# Patient Record
Sex: MALE | Race: WHITE
[De-identification: names, ages, dates, MRNs, and addresses within clinical notes are randomized per-mention and may not be internally consistent; named-entity substitution may affect disease eponyms.]

---

## 2017-02-10 ENCOUNTER — HOSPITAL ENCOUNTER (OUTPATIENT)
Dept: HOSPITAL 74 - FASU | Age: 57
Discharge: HOME | End: 2017-02-10
Attending: ORTHOPAEDIC SURGERY
Payer: COMMERCIAL

## 2017-02-10 VITALS — TEMPERATURE: 98.2 F

## 2017-02-10 VITALS — SYSTOLIC BLOOD PRESSURE: 116 MMHG | HEART RATE: 80 BPM | DIASTOLIC BLOOD PRESSURE: 76 MMHG

## 2017-02-10 VITALS — BODY MASS INDEX: 39.5 KG/M2

## 2017-02-10 DIAGNOSIS — M75.92: ICD-10-CM

## 2017-02-10 DIAGNOSIS — M65.812: ICD-10-CM

## 2017-02-10 DIAGNOSIS — M75.52: ICD-10-CM

## 2017-02-10 DIAGNOSIS — M19.012: ICD-10-CM

## 2017-02-10 DIAGNOSIS — M75.112: Primary | ICD-10-CM

## 2017-02-10 PROCEDURE — 0RNK4ZZ RELEASE LEFT SHOULDER JOINT, PERCUTANEOUS ENDOSCOPIC APPROACH: ICD-10-PCS | Performed by: ORTHOPAEDIC SURGERY

## 2017-02-10 PROCEDURE — 0RBK4ZZ EXCISION OF LEFT SHOULDER JOINT, PERCUTANEOUS ENDOSCOPIC APPROACH: ICD-10-PCS | Performed by: ORTHOPAEDIC SURGERY

## 2017-02-10 PROCEDURE — 0LB24ZZ EXCISION OF LEFT SHOULDER TENDON, PERCUTANEOUS ENDOSCOPIC APPROACH: ICD-10-PCS | Performed by: ORTHOPAEDIC SURGERY

## 2017-02-10 NOTE — OP
Operative Note





- Note:


Operative Date: 02/10/17


Pre-Operative Diagnosis: LEft shoulder OA, synovitis, partial cuff tear, labral 

tear


Operation: LSA, synovectomy, debridement, decompression


Surgeon: Juan Daniel Cash


Anesthesiologist/CRNA: John Paul Peters


Anesthesia: General


Operative Report Dictated: Yes

## 2017-02-10 NOTE — SURG
Surgery First Assist Note


First Assist: Chris Marlow PA-C


Date of Service: 02/10/17


Diagnosis: 


Left shoulder osteoarthritis, synovitis, partial cuff tear, labral tear





Procedure: 


Left shoulder arthroscopy, synovectomy, debridement, decompression


I was present for the entirety of the operative procedure. For further detail, 

please refer to operative report.








Visit type





- Case Type


Case Type: Scheduled Admission





- New patient


This patient is new to me today: Yes


Date on this admission: 02/10/17

## 2017-02-10 NOTE — DS
Physical Examination


Vital Signs: 


 Vital Signs











Temperature  98.8 F   02/10/17 08:15


 


Pulse Rate  75   02/10/17 08:15


 


Respiratory Rate  18   02/10/17 08:15


 


Blood Pressure  139/81   02/10/17 08:15


 


O2 Sat by Pulse Oximetry (%)  97   02/10/17 08:15














Discharge Summary


Reason For Visit: PARTIAL THICKNESS ROTATOR CUFF TEAR LEFT SHOULDER


Condition: Good





- Instructions


Diet, Activity, Other Instructions: 


                 Post Operative Instructions: Shoulder Arthroscopy


                           Dr Juan Daniel Cash  [phone]





1. Pain following a Shoulder Arthroscopy is variable and can be significant. 

Some 


    patients will have more pain than others. You have been provided with a 


    prescription for medication that contains a narcotic. You are not allowed to


    drive while on this medication. You should NOT take Tylenol (Acetaminophen)


    when taking the pain medication ( it will result in an overdose). Feel free 

to take 


    medications such as Ibuprofen or Naprosyn in addition to the pain medicine 

if 


    you do not have any problems with the NSAID class of medications. 





2. Apply ice to the shoulder for 15 minutes every hour. You may continue this 

for


    as many days as necessary.





3. You may find sleeping on an incline (reclining chair) to be more comfortable


    for the first few days.





4. You may remove your sling when the arm is comfortable.





5. You may use the arm as tolerated.





6. You may remove the bandages in 48 hours. You may shower at that point.


    


7. Place band-aids on the sutures after your shower.Do not put any creams


   or lotions on the incision until after the sutures are removed.





8. Please call the office to schedule a visit to have your sutures removed.





9. If for any reason you believe you may have an infection or are concerned,


   please feel free to call me. I can be reached through our office number 24 


   hours a day.





10. Please call our office with any questions; we will review the surgical 

findings


    during your post-operative visit.


     


Disposition: HOME





- Home Medications


Comprehensive Discharge Medication List: 


Ambulatory Orders





Amlodipine Besylate 5 mg PO DAILY 06/15/16 


Aspirin [Terence Chewable Aspirin] 81 mg PO DAILY #0  06/15/16 


Furosemide [Lasix -] 10 mg PO DAILY PRN 06/15/16 


Ibuprofen [Motrin -] 800 mg PO BID 06/15/16 


Omega-3 Fatty Acids/Fish Oil [Fish Oil 1,000 mg Softgel] 1 each PO DAILY 06/15/

16 


Oxymorphone HCl [Opana ER] 30 mg PO DAILY 06/15/16 


Prednisone 5 mg PO DAILY 06/15/16 


Wheat Dextrin [Benefiber] 144 gm PO DAILY #0 powder 06/15/16 


Colchicine [Colcrys] 0.6 mg PO DAILY 02/08/17 


Metformin HCl 500 mg PO DAILY 02/08/17

## 2017-02-14 NOTE — PATH
Surgical Pathology Report



Patient Name:  BARON SUE

Accession #:  

Med. Rec. #:  [id number]                                                        

   /Age/Gender:  1960 (Age: 56) / M

Account:  [id number]                                                          

             Location: [facility] AMBULATORY 

Taken:  2/10/2017

Received:  2/10/2017

Reported:  2017

Physicians:  Juan Daniel Cash M.D.

  



Specimen(s) Received

 LEFT SHOULDER SHAVINGS 





Clinical History

Partial-thickness rotator cuff tear left shoulder







Final Diagnosis

LEFT SHOULDER, ARTHROSCOPIC SHAVINGS:

PORTIONS OF FIBROCARTILAGE WITH MYXOID DEGENERATIVE CHANGES, SYNOVIUM, AND

HYALINE CARTILAGE.

CALCIFIED MATERIAL MORPHOLOGICALLY CONSISTENT WITH PSEUDOGOUT (CPPD) IDENTIFIED.





***Electronically Signed***

Jose Luis Riley M.D.





Gross Description

Received in formalin, labeled "left shoulder shavings," is a 3.5 x 3.0 x 0.3 cm.

aggregate of tan-yellow soft tissue fragments. A representative portion is

submitted in one cassette.

## 2018-03-06 ENCOUNTER — HOSPITAL ENCOUNTER (EMERGENCY)
Dept: HOSPITAL 74 - FER | Age: 58
Discharge: HOME | End: 2018-03-06
Payer: COMMERCIAL

## 2018-03-06 VITALS — BODY MASS INDEX: 32.3 KG/M2

## 2018-03-06 VITALS — SYSTOLIC BLOOD PRESSURE: 151 MMHG | HEART RATE: 82 BPM | TEMPERATURE: 98.3 F | DIASTOLIC BLOOD PRESSURE: 97 MMHG

## 2018-03-06 DIAGNOSIS — S60.211A: Primary | ICD-10-CM

## 2018-03-06 DIAGNOSIS — J45.909: ICD-10-CM
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
## 2018-03-06 DIAGNOSIS — S49.92XA: ICD-10-CM
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
## 2018-03-06 DIAGNOSIS — Z98.84: ICD-10-CM

## 2018-03-06 NOTE — PDOC
History of Present Illness





- General


Chief Complaint: Injury


Stated Complaint: FALL, BACK, CEHST, RT HIP, NURIS WRISTS


Time Seen by Provider: 03/06/18 10:13


History Source: Patient (Patient walked in complaining of a fall while getting 

through a door at a gas station )





Past History





- Past Medical History


Allergies/Adverse Reactions: 


 Allergies











Allergy/AdvReac Type Severity Reaction Status Date / Time


 


No Known Allergies Allergy   Verified 03/06/18 09:55











Home Medications: 


Ambulatory Orders





Amlodipine Besylate 5 mg PO DAILY 06/15/16 


Furosemide [Lasix -] 10 mg PO DAILY PRN 06/15/16 


Omega-3 Fatty Acids/Fish Oil [Fish Oil 1,000 mg Softgel] 1 each PO DAILY 06/15/

16 


Prednisone 5 mg PO DAILY 06/15/16 


Wheat Dextrin [Benefiber] 144 gm PO DAILY #0 powder 06/15/16 


Albuterol Sulfate Inhaler - [Ventolin Hfa Inhaler -] 2 inh PO Q4H 12/22/17 


Fluticasone Propionate [Flovent Hfa] 110 mcg IH BID 12/22/17 


Gabapentin 800 mg PO TID 12/22/17 


Multivitamin [Multiple Vitamins] 1 each PO DAILY 12/22/17 


Tizanidine HCl 2 mg PO TID 12/22/17 


Oxycodone HCl 10 mg PO TID PRN 03/06/18 








Anemia: No


Asthma: Yes


Cancer: No


Cardiac Disorders: No


CVA: No


COPD: No


CHF: No


Dementia: No


Diabetes: No


GI Disorders: No


 Disorders: No


HTN: Yes


Hypercholesterolemia: No


Liver Disease: No


Seizures: No


Thyroid Disease: No


Other medical history: ARTHRITIS, GOUT





- Surgical History


Abdominal Surgery: Yes (GASTRIC SLEEVE)


Appendectomy: Yes


Cardiac Surgery: No


Cholecystectomy: No


Lung Surgery: No


Neurologic Surgery: No


Orthopedic Surgery: Yes (RT ANKLE ORIF AND REMOVED 1999, left rotator cuff 

repair 2/17)





- Suicide/Smoking/Psychosocial Hx


Smoking History: Former smoker


Have you smoked in the past 12 months: No


If you are a former smoker, when did you quit?: 35 YEARS AGO AS A TEENAGER


Information on smoking cessation initiated: No


Hx Alcohol Use: No


Drug/Substance Use Hx: No


Substance Use Type: None


Hx Substance Use Treatment: No





*Physical Exam





- Vital Signs


 Last Vital Signs











Temp Pulse Resp BP Pulse Ox


 


 98.3 F   82   18   151/97   96 


 


 03/06/18 09:55  03/06/18 09:55  03/06/18 09:55  03/06/18 09:55  03/06/18 09:55














*DC/Admit/Observation/Transfer


Diagnosis at time of Disposition: 


Wrist contusion


Qualifiers:


 Encounter type: initial encounter Laterality: right Qualified Code(s): 

S60.211A - Contusion of right wrist, initial encounter





Shoulder injury


Qualifiers:


 Encounter type: initial encounter Laterality: left Qualified Code(s): S49.92XA 

- Unspecified injury of left shoulder and upper arm, initial encounter








- Discharge Dispostion


Condition at time of disposition: Stable


Admit: No





- Referrals


Referrals: 


Isaura Fuentes MD [Primary Care Provider] - 





- Patient Instructions


Printed Discharge Instructions:  How to Use a Sling





- Post Discharge Activity

## 2018-05-03 ENCOUNTER — HOSPITAL ENCOUNTER (EMERGENCY)
Dept: HOSPITAL 74 - JER | Age: 58
Discharge: HOME | End: 2018-05-03
Payer: COMMERCIAL

## 2018-05-03 VITALS — HEART RATE: 59 BPM | DIASTOLIC BLOOD PRESSURE: 88 MMHG | SYSTOLIC BLOOD PRESSURE: 127 MMHG

## 2018-05-03 VITALS — TEMPERATURE: 97.6 F

## 2018-05-03 VITALS — BODY MASS INDEX: 32.3 KG/M2

## 2018-05-03 DIAGNOSIS — I10: ICD-10-CM

## 2018-05-03 DIAGNOSIS — Z98.84: Primary | ICD-10-CM

## 2018-05-03 DIAGNOSIS — J45.909: ICD-10-CM

## 2018-05-03 LAB
ALBUMIN SERPL-MCNC: 4.5 G/DL (ref 3.4–5)
ALP SERPL-CCNC: 78 U/L (ref 45–117)
ALT SERPL-CCNC: 40 U/L (ref 12–78)
ANION GAP SERPL CALC-SCNC: 6 MMOL/L (ref 8–16)
ARTERIAL BLOOD GAS PCO2: 38.1 MMHG (ref 35–45)
ARTERIAL PATENCY WRIST A: POSITIVE
AST SERPL-CCNC: 21 U/L (ref 15–37)
BASE EXCESS BLDA CALC-SCNC: 5.7 MEQ/L (ref -2–2)
BASOPHILS # BLD: 0.6 % (ref 0–2)
BILIRUB SERPL-MCNC: 0.6 MG/DL (ref 0.2–1)
BUN SERPL-MCNC: 20 MG/DL (ref 7–18)
CALCIUM SERPL-MCNC: 9.4 MG/DL (ref 8.5–10.1)
CHLORIDE SERPL-SCNC: 101 MMOL/L (ref 98–107)
CO2 SERPL-SCNC: 30 MMOL/L (ref 21–32)
CREAT SERPL-MCNC: 0.9 MG/DL (ref 0.7–1.3)
DEPRECATED RDW RBC AUTO: 14.6 % (ref 11.9–15.9)
EOSINOPHIL # BLD: 0.4 % (ref 0–4.5)
GLUCOSE SERPL-MCNC: 118 MG/DL (ref 74–106)
HCT VFR BLD CALC: 41.7 % (ref 35.4–49)
HGB BLD-MCNC: 14.4 GM/DL (ref 11.7–16.9)
LYMPHOCYTES # BLD: 10.9 % (ref 8–40)
MAGNESIUM SERPL-MCNC: 2.2 MG/DL (ref 1.8–2.4)
MCH RBC QN AUTO: 30.1 PG (ref 25.7–33.7)
MCHC RBC AUTO-ENTMCNC: 34.5 G/DL (ref 32–35.9)
MCV RBC: 87.2 FL (ref 80–96)
MONOCYTES # BLD AUTO: 5.7 % (ref 3.8–10.2)
NEUTROPHILS # BLD: 82.4 % (ref 42.8–82.8)
PHOSPHATE SERPL-MCNC: 3.2 MG/DL (ref 2.5–4.9)
PLATELET # BLD AUTO: 276 K/MM3 (ref 134–434)
PMV BLD: 8.8 FL (ref 7.5–11.1)
PO2 BLDA: 73.6 MMHG (ref 80–100)
POTASSIUM SERPLBLD-SCNC: 3.9 MMOL/L (ref 3.5–5.1)
PROT SERPL-MCNC: 8.1 G/DL (ref 6.4–8.2)
RBC # BLD AUTO: 4.78 M/MM3 (ref 4–5.6)
SAO2 % BLDA: 96.6 % (ref 90–98.9)
SODIUM SERPL-SCNC: 137 MMOL/L (ref 136–145)
WBC # BLD AUTO: 9.4 K/MM3 (ref 4–10)

## 2018-05-03 PROCEDURE — 3E0337Z INTRODUCTION OF ELECTROLYTIC AND WATER BALANCE SUBSTANCE INTO PERIPHERAL VEIN, PERCUTANEOUS APPROACH: ICD-10-PCS

## 2018-05-03 NOTE — PDOC
Attending Attestation





- [facility]


HPI: 


05/03/18 11:14





The patient is 57 a year old male, with a significant past medical history of 

asthma, hypertension, rheumatoid arthritis and osteoarthritis, who presents to 

the emergency department s/p accidentally taking Lasix 200 mg this morning. He 

reports missing his dose of prednisone 5mg yesterday and tried to catch up 

today by taking 10mg. Since his pills come in 1mg aliquots, he took 10 pills of 

what he thought was prednisone but was actually 20mg lasix pills. He reports 

that it was dark and he did not have his glasses on and therefore, accidentally 

took Lasix instead of Prednisone since the pills look the similar. He currently 

notes finger cramping. The patient reports urinating up to 25 times since he 

initially took the medication. Currently in the ED, the patient reports 

dizziness, cramping, palpitations and blurry vision. The patient denies fever, 

chills, headache, nausea, vomiting, diarrhea, constipation, dysuria or 

hematuria. 





Allergies: None reported.


Past Surgical History: Gastric sleeve; Orthopedic surgeries (Right ankle, left 

shoulder).


Social History: Former smoker. Denies alcohol or drug use. 


PCP: Dr. Fuentes





- Physicial Exam


PE: 


05/03/18 11:15





GENERAL: Awake, alert, and fully oriented, in no acute distress.


HEAD: No signs of trauma.


EYES: PERRLA, EOMI, sclera anicteric, conjunctiva clear.


ENT: Auricles normal inspection, hearing grossly normal, nares patent, 

oropharynx clear without exudates. Moist mucosa.


NECK: Normal ROM, supple, no lymphadenopathy, JVD, or masses.


LUNGS: Breath sounds equal, clear to auscultation bilaterally. No wheezes, and 

no crackles.


HEART: Regular rate and rhythm, normal S1 and S2, no murmurs, rubs or gallops.


ABDOMEN: Soft, nontender, normoactive bowel sounds. No guarding, no rebound. No 

masses.


EXTREMITIES: Normal range of motion, no edema. No clubbing or cyanosis. No cords

, erythema, or tenderness.


BACK: No midline spinal tenderness in cervical/thoracic/lumbar region.


NEUROLOGICAL:  Normal speech, cranial nerves intact, negative pronator drift, 5/

5 strength in all 4 extremities, normal sensation to light touch in all 4 

extremities, normal cerebellar exam, normal gait, normal reflexes and tone.


SKIN: Warm, dry, normal turgor, no rashes or lesions noted.








- Medical Decision Making


05/03/18 11:16





Documentation prepared by Osiris Toledo, acting as medical scribe for Ekta Warner MD.





<Osiris Toledo - Last Filed: 05/03/18 11:16>





- Resident


Resident Name: Hoa Skinner I





- ED Attending Attestation


I have performed the following: I have examined & evaluated the patient, The 

case was reviewed & discussed with the resident, I agree w/resident's findings 

& plan, Exceptions are as noted





- Medical Decision Making





05/03/18 11:09


58yo M presents with accidental overdose of lasix - approx 200mg at 5am. Vitals 

wnl for now, labs wnl. Pt getting 2L lactated ringers at this time. Case 

discussed with poison control who recommends 6hr obs since ingestion. Will 

discuss with Dr. Fuentes.





05/03/18 12:25


Case discussed with Dr. Fuentes. Pt continues to feel well and requests DC. 

Advised to hydrate and given return precautions. 





I discussed the physical exam findings, ancillary test results and final 

diagnoses with the patient. I answered all of the patient's questions. The 

patient was satisfied with the care received and felt comfortable with the 

discharge plan and treatment plan. The patient will call their primary care 

physician within 24 hours to arrange follow-up and will return to the Emergency 

Department with any new, persistent or worsening symptoms. 








<Ekta Warner - Last Filed: 05/03/18 12:32>





**Discharge Disposition





<Osiris Toledo - Last Filed: 05/03/18 11:16>





- Discharge Dispostion


Last Admission D/C Date: 12/29/06


Admit: No





<Ekta Warner - Last Filed: 05/03/18 12:32>





- Diagnosis


Overdose


Qualifiers:


 Injury intent: accidental or unintentional 








- Discharge Dispostion


Disposition: HOME


Condition at time of disposition: Improved





- Referrals


Referrals: 


Isaura Fuentes MD [Primary Care Provider] - 1 week





- Patient Instructions


Printed Discharge Instructions:  DI for Drug Overdose in Adults


Additional Instructions: 


You were seen here for accidentally ingesting 200mg of lasix this morning


We checked your blood and did an EKG and everything looked normal


We gave you fluids to rehydrate you


We observed you up to six hours since the ingestion occurred, as recommended by 

Poison control and you seem to be stable


Please follow up with your primary doctor within 2-3 days


Return to the emergency department if you have any new, worsening, or 

concerning symptoms





- Post Discharge Activity

## 2018-05-03 NOTE — EKG
Test Reason : 

Blood Pressure : ***/*** mmHG

Vent. Rate : 063 BPM     Atrial Rate : 063 BPM

   P-R Int : 116 ms          QRS Dur : 090 ms

    QT Int : 402 ms       P-R-T Axes : 024 029 021 degrees

   QTc Int : 411 ms

 

NORMAL SINUS RHYTHM

NORMAL ECG

WHEN COMPARED WITH ECG OF 19-JUL-2009 11:05,

NO SIGNIFICANT CHANGE WAS FOUND

Confirmed by DOROTHEA RICHARD MD ([id number]) on 5/3/2018 1:01:02 PM

 

Referred By:             Confirmed By:DOROTHEA RICHARD MD

## 2018-05-03 NOTE — PDOC
History of Present Illness





- General


Chief Complaint: Overdose


Stated Complaint: LIGHTHEADED, RX PROBLEM (PCP SENT)


Time Seen by Provider: 05/03/18 09:03


History Source: Patient


Exam Limitations: No Limitations





- History of Present Illness


Initial Comments: 





05/03/18 09:15


Pt is a 56 yo with PMHx of HTN, Rh Arthritis and osteoarthritis with previous 

shoulder surgery, presenting following overdose of lasix this am. Pt was 

referred by his PCP- Dr Fuentes, because he took 10tablets of 20mg lasix 

accidentally today at 5 am instead of his prednisone tablets for Rh Arthritis. 

He said he was woken up by his dog this am and felt cramping of his fingers, 

without turning on the lights or wearing his reading glasses, he mistakenly 

took 10tablets of the lasix. He takes 5mg prednisone for Rh Arthritis, but was 

doubling up on a missed dose yesterday, so attempted to take 10mg, before 

realizing it was the lasix he took. He brought in both medications to the ED 

today.  Pt has passed urine up to 25 times this am, non bloody, no dysuria. 

Since he came into the ED, he has had cramping of his chest with palpitations, 

dizziness and blurry vision, but no shortness of breath and no syncope. Patient 

still took the prednisone 10tablets( 10mg), but skipped his amlodipine (5mg) 

and the precocet this am. He had a bagel and coffee this am, and has had no 

nausea or vomiting. Pt has no CAD or CHF but takes the lasix as needed for 

edema following his prednisone dose. 





05/03/18 13:01





Timing/Duration: 4-6 hours


Severity: moderate


Associated Symptoms: denies: chest pain, cough, diaphoresis, fever/chills, 

headaches, nausea/vomiting, syncope





Past History





- Past Medical History


Allergies/Adverse Reactions: 


 Allergies











Allergy/AdvReac Type Severity Reaction Status Date / Time


 


No Known Allergies Allergy   Verified 05/03/18 08:37











Home Medications: 


Ambulatory Orders





Amlodipine Besylate 5 mg PO DAILY 06/15/16 


Furosemide [Lasix -] 10 mg PO DAILY PRN 06/15/16 


Omega-3 Fatty Acids/Fish Oil [Fish Oil 1,000 mg Softgel] 1 each PO DAILY 06/15/

16 


Prednisone 5 mg PO DAILY 06/15/16 


Albuterol Sulfate Inhaler - [Ventolin Hfa Inhaler -] 2 inh PO PRN 12/22/17 


Fluticasone Propionate [Flovent Hfa] 110 mcg IH PRN 12/22/17 


Gabapentin 800 mg PO QID 12/22/17 


Multivitamin [Multiple Vitamins] 1 each PO DAILY 12/22/17 


Tizanidine HCl 2 mg PO TID 12/22/17 


Oxycodone HCl 10 mg PO TID PRN 03/06/18 








Anemia: No


Asthma: Yes


Cancer: No


Cardiac Disorders: No


CVA: No


COPD: No


CHF: No


DVT: No


Dementia: No


Diabetes: No


GI Disorders: No


 Disorders: No


HTN: Yes


Hypercholesterolemia: No


Liver Disease: No


Seizures: No


Thyroid Disease: No


Other medical history: RA, OA





- Surgical History


Abdominal Surgery: Yes (GASTRIC SLEEVE)


Appendectomy: Yes


Cardiac Surgery: No


Cholecystectomy: No


Lung Surgery: No


Neurologic Surgery: No


Orthopedic Surgery: Yes (RT ANKLE ORIF AND REMOVED 1999, left rotator cuff 

repair 2/17)





- Suicide/Smoking/Psychosocial Hx


Smoking History: Former smoker


Have you smoked in the past 12 months: No


If you are a former smoker, when did you quit?: 35 YEARS AGO AS A TEENAGER


Information on smoking cessation initiated: No


Hx Alcohol Use: No


Drug/Substance Use Hx: No


Substance Use Type: None


Hx Substance Use Treatment: No





**Review of Systems





- Review of Systems


Able to Perform ROS?: Yes


Is the patient limited English proficient: No


Constitutional: No: Chills, Diaphoresis, Fever


HEENTM: Yes: Blurred Vision.  No: Nose Congestion, Throat Pain


Respiratory: No: Cough, Orthopnea, Shortness of Breath, SOB at Rest, Stridor, 

Wheezing


Cardiac (ROS): Yes: Lightheadedness, Other (cramping generalized chest pain).  

No: Chest Pain


ABD/GI: No: Abdominal Distended, Constipated, Difficulty Swallowing, Vomiting


: Yes: Frequency (since taking the lasix this am).  No: Burning, Dysuria, 

Incontinence


Musculoskeletal: Yes: Joint Pain (bilateral digits)


Neurological: No: Headache, Numbness, Paresthesia, Seizure, Tingling, Tremors





*Physical Exam





- Vital Signs


 Last Vital Signs











Temp Pulse Resp BP Pulse Ox


 


 97.6 F   68   18   157/86   100 


 


 05/03/18 08:40  05/03/18 08:40  05/03/18 08:40  05/03/18 08:40  05/03/18 08:40














- Physical Exam


General Appearance: Yes: Appropriately Dressed


HEENT: positive: EOMI, CATIA.  negative: Scleral Icterus (L)


Neck: positive: Supple.  negative: Tender


Respiratory/Chest: positive: Lungs Clear, Normal Breath Sounds.  negative: 

Respiratory Distress


Cardiovascular: positive: Regular Rhythm, Regular Rate, S1, S2


Gastrointestinal/Abdominal: positive: Normal Bowel Sounds, Soft.  negative: 

Tender


Musculoskeletal: negative: CVA Tenderness


Extremity: positive: Normal Inspection, Normal Range of Motion.  negative: 

Tender, Coldness, Cyanosis, Pedal Edema


Integumentary: positive: Dry, Warm


Neurologic: positive: Fully Oriented, Alert, Motor Strength 5/5.  negative: 

Facial Droop, Numbness, Confused, Disoriented





**Heart Score/ECG Review





- ECG Impressions


Comment:: 





05/03/18 10:32


Normal sinus rhythm


Ventricular rate-63bpm


QT/QTc- 402/411 





ED Treatment Course





- LABORATORY


CBC & Chemistry Diagram: 


 05/03/18 09:25





 05/03/18 09:25





Medical Decision Making





- Medical Decision Making





05/03/18 09:32


CBC, CMP, Mg, Phosphorus, EKG, lactated ringers


05/03/18 11:02


CBC,CMP wnl


EKG- normal


Pat, the nurse spoke with poison control and they said to monitor for 6 hours.


Plan is to discuss his case with Dr Fuentes





05/03/18 12:10


Pending call back from Dr Fuentes.


Pt was signed out to Dr Coombs





*DC/Admit/Observation/Transfer


Diagnosis at time of Disposition: 


Overdose


Qualifiers:


 Injury intent: accidental or unintentional 








- Discharge Dispostion


Disposition: HOME


Condition at time of disposition: Improved


Admit: No





- Referrals


Referrals: 


Isaura Fuentes MD [Primary Care Provider] - 1 week





- Patient Instructions


Printed Discharge Instructions:  DI for Drug Overdose in Adults


Additional Instructions: 


You were seen here for accidentally ingesting 200mg of lasix this morning


We checked your blood and did an EKG and everything looked normal


We gave you fluids to rehydrate you


We observed you up to six hours since the ingestion occurred, as recommended by 

Poison control and you seem to be stable


Please follow up with your primary doctor within 2-3 days


Return to the emergency department if you have any new, worsening, or 

concerning symptoms





- Post Discharge Activity





- Attestations


Physician Attestion: 





05/03/18 11:09


Hoa Skinner MD